# Patient Record
Sex: FEMALE | Race: WHITE | NOT HISPANIC OR LATINO | Employment: FULL TIME | URBAN - METROPOLITAN AREA
[De-identification: names, ages, dates, MRNs, and addresses within clinical notes are randomized per-mention and may not be internally consistent; named-entity substitution may affect disease eponyms.]

---

## 2018-04-02 ENCOUNTER — OFFICE VISIT (OUTPATIENT)
Dept: URGENT CARE | Facility: CLINIC | Age: 43
End: 2018-04-02

## 2018-04-02 ENCOUNTER — NON-PROVIDER VISIT (OUTPATIENT)
Dept: URGENT CARE | Facility: CLINIC | Age: 43
End: 2018-04-02

## 2018-04-02 DIAGNOSIS — Z01.89 RESPIRATORY CLEARANCE EXAMINATION, ENCOUNTER FOR: ICD-10-CM

## 2018-04-02 DIAGNOSIS — Z29.89 NEED FOR ISOLATION: ICD-10-CM

## 2018-04-02 PROCEDURE — 94375 RESPIRATORY FLOW VOLUME LOOP: CPT | Performed by: FAMILY MEDICINE

## 2018-04-02 PROCEDURE — 94010 BREATHING CAPACITY TEST: CPT | Performed by: FAMILY MEDICINE

## 2018-04-02 PROCEDURE — 8916 PR CLEARANCE ONLY: Performed by: FAMILY MEDICINE

## 2018-04-02 NOTE — PROGRESS NOTES
Saida Espinoza is a 42 y.o. female here for a non-provider visit for Mask Fit/ Respiratory Clearance    If abnormal was an in office provider notified today (if so, indicate provider)? Yes  Routed to PCP? No

## 2018-04-09 ENCOUNTER — APPOINTMENT (OUTPATIENT)
Dept: RADIOLOGY | Facility: IMAGING CENTER | Age: 43
End: 2018-04-09
Attending: NURSE PRACTITIONER
Payer: COMMERCIAL

## 2018-04-09 ENCOUNTER — OCCUPATIONAL MEDICINE (OUTPATIENT)
Dept: URGENT CARE | Facility: CLINIC | Age: 43
End: 2018-04-09
Payer: COMMERCIAL

## 2018-04-09 VITALS
WEIGHT: 88.6 LBS | OXYGEN SATURATION: 98 % | HEIGHT: 55 IN | TEMPERATURE: 98.2 F | HEART RATE: 97 BPM | DIASTOLIC BLOOD PRESSURE: 76 MMHG | RESPIRATION RATE: 16 BRPM | BODY MASS INDEX: 20.51 KG/M2 | SYSTOLIC BLOOD PRESSURE: 104 MMHG

## 2018-04-09 DIAGNOSIS — S67.10XA CRUSHING INJURY OF FINGER, INITIAL ENCOUNTER: ICD-10-CM

## 2018-04-09 PROCEDURE — 99214 OFFICE O/P EST MOD 30 MIN: CPT | Mod: 29 | Performed by: NURSE PRACTITIONER

## 2018-04-09 PROCEDURE — 73130 X-RAY EXAM OF HAND: CPT | Mod: TC,LT,29 | Performed by: NURSE PRACTITIONER

## 2018-04-09 RX ORDER — BUSPIRONE HYDROCHLORIDE 10 MG/1
10 TABLET ORAL 2 TIMES DAILY
COMMUNITY

## 2018-04-09 RX ORDER — HYDROXYZINE HYDROCHLORIDE 25 MG/1
25 TABLET, FILM COATED ORAL 3 TIMES DAILY PRN
COMMUNITY

## 2018-04-09 RX ORDER — SERTRALINE HYDROCHLORIDE 100 MG/1
100 TABLET, FILM COATED ORAL DAILY
COMMUNITY

## 2018-04-09 ASSESSMENT — ENCOUNTER SYMPTOMS
FALLS: 0
FEVER: 0
CHILLS: 0

## 2018-04-09 ASSESSMENT — PAIN SCALES - GENERAL: PAINLEVEL: 3=SLIGHT PAIN

## 2018-04-09 NOTE — LETTER
[facility]  [address], [address] [place] [address]  Phone:  [phone] - Fax:  [phone]   [facility] Progress Report and Disability Certification  Date of Service: 4/9/2018   No Show:  No  Date / Time of Next Visit: 4/16/2018   Claim Information   Patient Name: Saida Espinoza  Claim Number:     Employer:   [employer] Date of Injury: 4/9/2018     Insurer / TPA: [employer]  ID / SSN:     Occupation: Associate   Diagnosis: The encounter diagnosis was Crushing injury of finger, initial encounter.    Medical Information   Related to Industrial Injury? Yes    Subjective Complaints:  DOI 4/9/18 1st visit.  Patient was at work, lifting a tray weighing approximately 100 pounds.  As she and her partner set down the tray, there was a portion of the table that had a raised metal platform and she crushed her left middle finger between the tray and the metal.  She noted immediate pain and swelling around the PIP joint of the middle finger.  She applied ice, which helped with the swelling.  She took ibuprofen OTC for pain.  She rates pain a 3/10, worse with movement.  Denies any prior injury, pain, or limitation.  She is right hand dominant.     Objective Findings: Patient is alert, oriented, and in no acute distress.  Heart rate regular.  Respiratory rate and effort regular.  Left hand is warm, dry, and intact with no bruising, erythema, rash or lesion.  Trace focal swelling around the PIP joint of the middle finger.  Focal TTP with patient reluctant to perform ROM due to pain.  NV intact.  Sensation intact.  Cap refill < 2 seconds.  X-ray: no fracture or dislocation noted.   Pre-Existing Condition(s):     Assessment:   Initial Visit    Status: Additional Care Required  Permanent Disability:No    Plan: Medication  Comments:OTC NSAIDs and tylenol prn pain.  Ice compress and elevation prn pain.  Finger splint prn comfort measures.       Diagnostics: X-ray  Comments:No acute fracture or dislocation.    Comments:       Disability Information   Status: Released to Full Duty    From:  4/9/2018  Through: 4/16/2018 Restrictions are:     Physical Restrictions   Sitting:    Standing:    Stooping:    Bending:      Squatting:    Walking:    Climbing:    Pushing:      Pulling:    Other:    Reaching Above Shoulder (L):   Reaching Above Shoulder (R):       Reaching Below Shoulder (L):    Reaching Below Shoulder (R):      Not to exceed Weight Limits   Carrying(hrs):   Weight Limit(lb):   Lifting(hrs):   Weight  Limit(lb):     Comments:      Repetitive Actions   Hands: i.e. Fine Manipulations from Grasping:     Feet: i.e. Operating Foot Controls:     Driving / Operate Machinery:     Physician Name: MANSOOR Lares Physician Signature: LISETTE Simmons e-Signature: Dr. Richar Gomez, Medical Director   Clinic Name / Location: [address], [address] [place] [address] Clinic Phone Number: Dept: [phone]   Appointment Time: 3:15 Pm Visit Start Time: 3:27 PM   Check-In Time:  3:17 Pm Visit Discharge Time:  4:15 PM   Original-Treating Physician or Chiropractor    Page 2-Insurer/TPA    Page 3-Employer    Page 4-Employee

## 2018-04-09 NOTE — LETTER
[facility]  [address], [address] [place] [address]  Phone:  [phone] - Fax:  [phone]   [facility] Progress Report and Disability Certification  Date of Service: 4/9/2018   No Show:  No  Date / Time of Next Visit: 4/16/2018   Claim Information   Patient Name: Saida Espinoza  Claim Number:     Employer:   [employer] Date of Injury: 4/9/2018     Insurer / TPA: [employer]  ID / SSN:     Occupation: Associate   Diagnosis: The encounter diagnosis was Crushing injury of finger, initial encounter.    Medical Information   Related to Industrial Injury? Yes    Subjective Complaints:  DOI 4/9/18 1st visit.  Patient was at work, lifting a tray weighing approximately 100 pounds.  As she and her partner set down the tray, there was a portion of the table that had a raised metal platform and she crushed her left middle finger between the tray and the metal.  She noted immediate pain and swelling around the PIP joint of the middle finger.  She applied ice, which helped with the swelling.  She took ibuprofen OTC for pain.  She rates pain a 3/10, worse with movement.  Denies any prior injury, pain, or limitation.  She is right hand dominant.     Objective Findings: Patient is alert, oriented, and in no acute distress.  Heart rate regular.  Respiratory rate and effort regular.  Left hand is warm, dry, and intact with no bruising, erythema, rash or lesion.  Trace focal swelling around the PIP joint of the middle finger.  Focal TTP with patient reluctant to perform ROM due to pain.  NV intact.  Sensation intact.  Cap refill < 2 seconds.  X-ray: no fracture or dislocation noted.   Pre-Existing Condition(s):     Assessment:   Initial Visit    Status: Additional Care Required  Permanent Disability:No    Plan: Medication  Comments:OTC NSAIDs and tylenol prn pain.  Ice compress and elevation prn pain.  Finger splint prn comfort measures.       Diagnostics: X-ray  Comments:No acute fracture or dislocation.    Comments:       Disability Information   Status: Released to Full Duty    From:  4/9/2018  Through: 4/16/2018 Restrictions are:     Physical Restrictions   Sitting:    Standing:    Stooping:    Bending:      Squatting:    Walking:    Climbing:    Pushing:      Pulling:    Other:    Reaching Above Shoulder (L):   Reaching Above Shoulder (R):       Reaching Below Shoulder (L):    Reaching Below Shoulder (R):      Not to exceed Weight Limits   Carrying(hrs):   Weight Limit(lb): < or = to 10 pounds  Comments:Restrictions apply to left hand. Lifting(hrs):   Weight  Limit(lb): < or = to 10 pounds  Comments:Restrictions apply to left hand.   Comments:      Repetitive Actions   Hands: i.e. Fine Manipulations from Grasping: < or = to 1 hr/day  Comments:restrictions apply to left hand.   Feet: i.e. Operating Foot Controls:     Driving / Operate Machinery:     Physician Name: MANSOOR Lares Physician Signature: LISETTE Simmons e-Signature: Dr. Richar Gomez, Medical Director   Clinic Name / Location: [address], [address]  [facility] [place] [address] Clinic Phone Number: Dept: [phone]   Appointment Time: 3:15 Pm Visit Start Time: 3:27 PM   Check-In Time:  3:17 Pm Visit Discharge Time: 4:20 Pm    Original-Treating Physician or Chiropractor    Page 2-Insurer/TPA    Page 3-Employer    Page 4-Employee

## 2018-04-09 NOTE — LETTER
"EMPLOYEE’S CLAIM FOR COMPENSATION/ REPORT OF INITIAL TREATMENT  FORM C-4    EMPLOYEE’S CLAIM - PROVIDE ALL INFORMATION REQUESTED   First Name  Saida Last Name  Olga Birthdate                    1975                Sex  female Claim Number   Home Address  [address] Age  42 y.o. 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 (if applicable)  Lifting heavy tray    How did this injury or occupational disease occur? (Be specific an answer in detail. Use additional sheet if necessary)  A  very heavy tray was set on my hand.   If you believe that you have an occupational disease, when did you first have knowledge of the disability and it relationship to your employment?  n/a Witnesses to the Accident  n/a      Nature of Injury or Occupational Disease  Workers' Compensation  Part(s) of Body Injured or Affected  Finger (L), ,     I certify that the above is true and correct to the best of my knowledge and that I have provided this information in order to obtain the benefits of [place]’s " [place]  I hereby authorize any physician, chiropractor, surgeon, practitioner, or other person, any hospital, including [facility] or [facility], any medical service organization, any insurance company, or other institution or organization to release to each other, any medical or other information, including benefits paid or payable, pertinent to this injury or disease, except information relative to diagnosis, treatment and/or counseling for AIDS, psychological conditions, alcohol or controlled substances, for which I must give specific authorization.  A Photostat of this authorization shall be as valid as the original.     Date 4/9/18   Place [facility]   Employee’s Signature   THIS REPORT MUST BE COMPLETED AND MAILED WITHIN 3 WORKING DAYS OF TREATMENT   Place  [facility]  Name of Facility  [facility]   Date  4/9/2018 Diagnosis  (S67.10XA) Crushing injury of finger, initial encounter Is there evidence the injured employee was under the influence of alcohol and/or another controlled substance at the time of accident?   Hour  3:27 PM Description of Injury or Disease  The encounter diagnosis was Crushing injury of finger, initial encounter. No   Treatment  OTC NSAIDs and tylenol prn pain.  Ice compress and elevation prn pain.  Finger splint applied prn comfort measures.  Have you advised the patient to remain off work five days or more? No   X-Ray Findings  Negative  Comments:No acute fracture or dislocation.   If Yes   From Date  To Date      From information given by the employee, together with medical evidence, can you directly connect this injury or occupational disease as job incurred?  Yes If No Full Duty  No Modified Duty  Yes   Is additional medical care by a physician indicated?  Yes  Comments:Follow up in 1 week. If Modified Duty, Specify any Limitations /  "Restrictions  Limit fine hand manipulatives with left hand to 1 hour.  Limit lifting and carrying to 10 pounds with the left hand.   Do you know of any previous injury or disease contributing to this condition or occupational disease?                            No   Date  4/10/2018 Print Doctor’s Name MANSOOR Lares certify the employer’s copy of  this form was mailed on:   Address  [address] Insurer’s Use Only     [place] Zip  [address]    Provider’s Tax ID Number  [id number] Telephone  Dept: [phone]        e-LISETTE Elliott   e-Signature: Dr. Richar Gomez, Medical Director Degree  APRN        ORIGINAL-TREATING PHYSICIAN OR CHIROPRACTOR    PAGE 2-INSURER/TPA    PAGE 3-EMPLOYER    PAGE 4-EMPLOYEE             Form C-4 (rev10/07)              BRIEF DESCRIPTION OF RIGHTS AND BENEFITS  (Pursuant to [place]RS 616C.050)    Notice of Injury or Occupational Disease (Incident Report Form C-1): If an injury or occupational disease (OD) arises out of and in the  course of employment, you must provide written notice to your employer as soon as practicable, but no later than 7 days after the accident or  OD. Your employer shall maintain a sufficient supply of the required forms.    Claim for Compensation (Form C-4): If medical treatment is sought, the form C-4 is available at the place of initial treatment. A completed  \"Claim for Compensation\" (Form C-4) must be filed within 90 days after an accident or OD. The treating physician or chiropractor must,  within 3 working days after treatment, complete and mail to the employer, the employer's insurer and third-party , the Claim for  Compensation.    Medical Treatment: If you require medical treatment for your on-the-job injury or OD, you may be required to select a physician or  chiropractor from a list provided by your workers’ compensation insurer, if it has contracted with an Organization for Managed Care " (MCO) or  Preferred Provider Organization (PPO) or providers of health care. If your employer has not entered into a contract with an MCO or PPO, you  may select a physician or chiropractor from the Panel of Physicians and Chiropractors. Any medical costs related to your industrial injury or  OD will be paid by your insurer.    Temporary Total Disability (TTD): If your doctor has certified that you are unable to work for a period of at least 5 consecutive days, or 5  cumulative days in a 20-day period, or places restrictions on you that your employer does not accommodate, you may be entitled to TTD  compensation.    Temporary Partial Disability (TPD): If the wage you receive upon reemployment is less than the compensation for TTD to which you are  entitled, the insurer may be required to pay you TPD compensation to make up the difference. TPD can only be paid for a maximum of 24  months.    Permanent Partial Disability (PPD): When your medical condition is stable and there is an indication of a PPD as a result of your injury or  OD, within 30 days, your insurer must arrange for an evaluation by a rating physician or chiropractor to determine the degree of your PPD. The  amount of your PPD award depends on the date of injury, the results of the PPD evaluation and your age and wage.    Permanent Total Disability (PTD): If you are medically certified by a treating physician or chiropractor as permanently and totally disabled  and have been granted a PTD status by your insurer, you are entitled to receive monthly benefits not to exceed 66 2/3% of your average  monthly wage. The amount of your PTD payments is subject to reduction if you previously received a PPD award.    Vocational Rehabilitation Services: You may be eligible for vocational rehabilitation services if you are unable to return to the job due to a  permanent physical impairment or permanent restrictions as a result of your injury or occupational  disease.    Transportation and Per Myriam Reimbursement: You may be eligible for travel expenses and per myriam associated with medical treatment.    Reopening: You may be able to reopen your claim if your condition worsens after claim closure.    Appeal Process: If you disagree with a written determination issued by the insurer or the insurer does not respond to your request, you may  appeal to the Department of Administration, , by following the instructions contained in your determination letter. You must  appeal the determination within 70 days from the date of the determination letter at [address], [address], [place], [place]  [address], or [address], [place], [place] [address]. If you disagree with the  decision, you may appeal to the  Department of Administration, . You must file your appeal within 30 days from the date of the  decision  letter at [address], [address], [place], [place] [address], or [address], [place], [place] [address]. If you  disagree with a decision of an , you may file a petition for judicial review with the District Court. You must do so within 30  days of the Appeal Officer’s decision. You may be represented by an  at your own expense or you may contact the [facility] for possible  representation.    [place]  for Injured Workers (NAIW): If you disagree with a  decision, you may request that NAIW represent you  without charge at an  Hearing. For information regarding denial of benefits, you may contact the [facility] at: [address], [address], [place] [address], [phone], or [address] [facility] [address], [place] [address], [phone]    To File a Complaint with the Division: If you wish to file a complaint with the  of the Division of Industrial Relations (DIR),  please contact  the Workers’ Compensation Section, [address], [address], [place], [place] [address], telephone ([phone]) [phone], or  [address], [address], [place], [place] [address], telephone [phone].    For assistance with Workers’ Compensation Issues: you may contact the Office of the [facility] Consumer Health Assistance, [address] [place] [address], [address], [place], [place] [address], Toll Free 1-697.573.1060, Web site: http://govcha.[facility].nv., E-mail  Halina@[facility].nv.                                                                                                                                                                                                                                   __________________________________________________________________                                                                   ______4/9/18_________                Employee Name / Signature                                                                                                                                                       Date                                                                                                                                                                                                     D-2 (rev. 10/07)

## 2018-04-09 NOTE — LETTER
"EMPLOYEE’S CLAIM FOR COMPENSATION/ REPORT OF INITIAL TREATMENT  FORM C-4    EMPLOYEE’S CLAIM - PROVIDE ALL INFORMATION REQUESTED   First Name  Saida Last Name  Olga Birthdate                    1975                Sex  female Claim Number   Home Address  [address] Age  42 y.o. 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 (if applicable)  Lifting heavy tray    How did this injury or occupational disease occur? (Be specific an answer in detail. Use additional sheet if necessary)  A  very heavy tray was set on my hand.   If you believe that you have an occupational disease, when did you first have knowledge of the disability and it relationship to your employment?  n/a Witnesses to the Accident  n/a      Nature of Injury or Occupational Disease  Workers' Compensation  Part(s) of Body Injured or Affected  Finger (L), ,     I certify that the above is true and correct to the best of my knowledge and that I have provided this information in order to obtain the benefits of [place]’s " [place] inclusive or Chapter 617 of [place]).  I hereby authorize any physician, chiropractor, surgeon, practitioner, or other person, any hospital, including [facility] or [facility], any medical service organization, any insurance company, or other institution or organization to release to each other, any medical or other information, including benefits paid or payable, pertinent to this injury or disease, except information relative to diagnosis, treatment and/or counseling for AIDS, psychological conditions, alcohol or controlled substances, for which I must give specific authorization.  A Photostat of this authorization shall be as valid as the original.     Date 4/9/18   Place [facility]   Employee’s Signature   THIS REPORT MUST BE COMPLETED AND MAILED WITHIN 3 WORKING DAYS OF TREATMENT   Place  [facility]  Name of Facility  [facility]   Date  4/9/2018 Diagnosis  (S67.10XA) Crushing injury of finger, initial encounter Is there evidence the injured employee was under the influence of alcohol and/or another controlled substance at the time of accident?   Hour  3:27 PM Description of Injury or Disease  The encounter diagnosis was Crushing injury of finger, initial encounter. No   Treatment  OTC NSAIDs and tylenol prn pain.  Ice compress and elevation prn pain.  Finger splint applied prn comfort measures.  Have you advised the patient to remain off work five days or more? No   X-Ray Findings  Negative  Comments:No acute fracture or dislocation.   If Yes   From Date  To Date      From information given by the employee, together with medical evidence, can you directly connect this injury or occupational disease as job incurred?  Yes If No Full Duty  Yes Modified Duty      Is additional medical care by a physician indicated?  Yes  Comments:Follow up in 1 week. If Modified Duty, Specify any Limitations /  "Restrictions      Do you know of any previous injury or disease contributing to this condition or occupational disease?                            No   Date  4/9/2018 Print Doctor’s Name MANSOOR Lares I certify the employer’s copy of  this form was mailed on:   Address  [address], [address] Insurer’s Use Only     [place] Zip  [address]    Provider’s Tax ID Number  [id number] Telephone  Dept: [phone]        e-LISETTE Elliott   e-Signature: Dr. Richar Gomez, Medical Director Degree  APRN        ORIGINAL-TREATING PHYSICIAN OR CHIROPRACTOR    PAGE 2-INSURER/TPA    PAGE 3-EMPLOYER    PAGE 4-EMPLOYEE             Form C-4 (rev10/07)              BRIEF DESCRIPTION OF RIGHTS AND BENEFITS  (Pursuant to NRS 616C.050)    Notice of Injury or Occupational Disease (Incident Report Form C-1): If an injury or occupational disease (OD) arises out of and in the  course of employment, you must provide written notice to your employer as soon as practicable, but no later than 7 days after the accident or  OD. Your employer shall maintain a sufficient supply of the required forms.    Claim for Compensation (Form C-4): If medical treatment is sought, the form C-4 is available at the place of initial treatment. A completed  \"Claim for Compensation\" (Form C-4) must be filed within 90 days after an accident or OD. The treating physician or chiropractor must,  within 3 working days after treatment, complete and mail to the employer, the employer's insurer and third-party , the Claim for  Compensation.    Medical Treatment: If you require medical treatment for your on-the-job injury or OD, you may be required to select a physician or  chiropractor from a list provided by your workers’ compensation insurer, if it has contracted with an Organization for Managed Care (MCO) or  Preferred Provider Organization (PPO) or providers of health care. If your employer has not entered into a " contract with an MCO or PPO, you  may select a physician or chiropractor from the Panel of Physicians and Chiropractors. Any medical costs related to your industrial injury or  OD will be paid by your insurer.    Temporary Total Disability (TTD): If your doctor has certified that you are unable to work for a period of at least 5 consecutive days, or 5  cumulative days in a 20-day period, or places restrictions on you that your employer does not accommodate, you may be entitled to TTD  compensation.    Temporary Partial Disability (TPD): If the wage you receive upon reemployment is less than the compensation for TTD to which you are  entitled, the insurer may be required to pay you TPD compensation to make up the difference. TPD can only be paid for a maximum of 24  months.    Permanent Partial Disability (PPD): When your medical condition is stable and there is an indication of a PPD as a result of your injury or  OD, within 30 days, your insurer must arrange for an evaluation by a rating physician or chiropractor to determine the degree of your PPD. The  amount of your PPD award depends on the date of injury, the results of the PPD evaluation and your age and wage.    Permanent Total Disability (PTD): If you are medically certified by a treating physician or chiropractor as permanently and totally disabled  and have been granted a PTD status by your insurer, you are entitled to receive monthly benefits not to exceed 66 2/3% of your average  monthly wage. The amount of your PTD payments is subject to reduction if you previously received a PPD award.    Vocational Rehabilitation Services: You may be eligible for vocational rehabilitation services if you are unable to return to the job due to a  permanent physical impairment or permanent restrictions as a result of your injury or occupational disease.    Transportation and Per Myriam Reimbursement: You may be eligible for travel expenses and per myriam associated with  medical treatment.    Reopening: You may be able to reopen your claim if your condition worsens after claim closure.    Appeal Process: If you disagree with a written determination issued by the insurer or the insurer does not respond to your request, you may  appeal to the Department of Administration, , by following the instructions contained in your determination letter. You must  appeal the determination within 70 days from the date of the determination letter at [address], [address], [place], [place]  [address], or [address], [place], [place] [address]. If you disagree with the  decision, you may appeal to the  Department of Administration, . You must file your appeal within 30 days from the date of the  decision  letter at [address], [address], [place], [place] [address], or [address], [place], [place] [address]. If you  disagree with a decision of an , you may file a petition for judicial review with the District Court. You must do so within 30  days of the Appeal Officer’s decision. You may be represented by an  at your own expense or you may contact the [facility] for possible  representation.    [place]  for Injured Workers (NAIW): If you disagree with a  decision, you may request that NAIW represent you  without charge at an  Hearing. For information regarding denial of benefits, you may contact the [facility] at: [address], [address], [place] [address], [phone], or [address], [address], [place] [address], [phone]    To File a Complaint with the Division: If you wish to file a complaint with the  of the Division of Industrial Relations (DIR),  please contact the Workers’ Compensation Section, [address], [address], [place], [place] [address], telephone [phone]) [phone],  or  [address], [address], [place], [place] [address], telephone [phone]) [phone].    For assistance with Workers’ Compensation Issues: you may contact the Office of the Governor Consumer Health Assistance, [address] [place] [address], [address], [place], [place] [address], Toll Free 1-190.571.1528, Web site: http://[employer].[facility].nv., E-mail  Halina@[email or web address].[email or web address].                                                                                                                                                                                                                                   __________________________________________________________________                                                                   ______4/9/18___________                Employee Name / Signature                                                                                                                                                       Date                                                                                                                                                                                                     D-2 (rev. 10/07)

## 2018-04-09 NOTE — PROGRESS NOTES
"Subjective:      Saida Espinoza is a 42 y.o. female who presents with Finger Pain (left middle finger crush injury today; approx 100lbs on finger )      DOI 4/9/18 1st visit.  Patient was at work, lifting a tray weighing approximately 100 pounds.  As she and her partner set down the tray, there was a portion of the table that had a raised metal platform and she crushed her left middle finger between the tray and the metal.  She noted immediate pain and swelling around the PIP joint of the middle finger.  She applied ice, which helped with the swelling.  She took ibuprofen OTC for pain.  She rates pain a 3/10, worse with movement.  Denies any prior injury, pain, or limitation.  She is right hand dominant.       HPI    Review of Systems   Constitutional: Negative for chills, fever and malaise/fatigue.   Musculoskeletal: Positive for joint pain. Negative for falls.     Medications, Allergies, and current problem list reviewed today in Epic     Objective:     /76   Pulse 97   Temp 36.8 °C (98.2 °F)   Resp 16   Ht 1.397 m (4' 7\")   Wt 40.2 kg (88 lb 9.6 oz)   SpO2 98%   BMI 20.59 kg/m²      Physical Exam    Patient is alert, oriented, and in no acute distress.  Heart rate regular.  Respiratory rate and effort regular.  Left hand is warm, dry, and intact with no bruising, erythema, rash or lesion.  Trace focal swelling around the PIP joint of the middle finger.  Focal TTP with patient reluctant to perform ROM due to pain.  NV intact.  Sensation intact.  Cap refill < 2 seconds.  X-ray: no fracture or dislocation noted.     View [employer]     DX-HAND 3+ (Order #[id number]) on 4/9/18   Narrative       4/9/2018 3:44 PM    HISTORY/REASON FOR EXAM:  Pain/Deformity Following Trauma  Crush injury    TECHNIQUE/EXAM DESCRIPTION AND NUMBER OF VIEWS:  3 views of the LEFT hand.    COMPARISON:  None    FINDINGS:  There is no evidence of fracture or dislocation. Alignment is maintained. No osseous erosion or " "periosteal new bone formation is seen. There is mild periarticular osteopenia.     Impression       No evidence of acute fracture or dislocation.   Reading Provider Reading Date   Lizeth Cabral M.D. Apr 9, 2018   Signing Provider Signing Date Signing Time   Lizeth Cabral M.D. Apr 9, 2018  4:08 PM       Assessment/Plan:     1. Crushing injury of finger, initial encounter  - DX-HAND 3+ LEFT; Future    OTC NSAIDs or tylenol prn pain.  Ice compress and elevation prn pain.  Finger splint prn comfort measures.  No work restrictions.  Follow up in 1 week.  Discussed incidental finding of mild periarticular osteopenia with patient.  Recommend she follow up with PCP for further evaluation and care.  Patient is 4'7\" with a weight of 88 lbs and has a definite risk for osteoporosis.    Patient verbalized understanding of and agreed with plan of care.  4/10/18 3:10 PM  PC from patient.  At time of visit yesterday, patient felt that she would be able to perform full duty without difficulty.  However, today full duty exacerbated pain to 8/10 and she went home.  She would like to consider work restrictions that were discussed as possible plan of care at visit yesterday.  Will addend D-39 and C-4 to reflect plan of care with the following restrictions: Limit fine hand manipulatives to 1 hour per day with left hand.  Limit lift and carry to 10 pounds with the left hand.  Continue OTC NSAIDs/tylenol and finger splint as previously discussed.  Follow up as scheduled.  Patient verbalized understanding of and agreed with plan of care.  "

## 2018-04-16 ENCOUNTER — OCCUPATIONAL MEDICINE (OUTPATIENT)
Dept: URGENT CARE | Facility: CLINIC | Age: 43
End: 2018-04-16
Payer: COMMERCIAL

## 2018-04-16 VITALS
DIASTOLIC BLOOD PRESSURE: 74 MMHG | HEIGHT: 55 IN | OXYGEN SATURATION: 98 % | HEART RATE: 86 BPM | BODY MASS INDEX: 20.37 KG/M2 | RESPIRATION RATE: 16 BRPM | SYSTOLIC BLOOD PRESSURE: 96 MMHG | TEMPERATURE: 97.9 F | WEIGHT: 88 LBS

## 2018-04-16 DIAGNOSIS — S67.10XD CRUSHING INJURY OF FINGER, SUBSEQUENT ENCOUNTER: ICD-10-CM

## 2018-04-16 PROCEDURE — 99213 OFFICE O/P EST LOW 20 MIN: CPT | Mod: 29 | Performed by: NURSE PRACTITIONER

## 2018-04-16 ASSESSMENT — ENCOUNTER SYMPTOMS
DIAPHORESIS: 0
SENSORY CHANGE: 0
WEAKNESS: 0
FEVER: 0
TINGLING: 0
FOCAL WEAKNESS: 0
CHILLS: 0

## 2018-04-16 ASSESSMENT — PAIN SCALES - GENERAL: PAINLEVEL: 1=MINIMAL PAIN

## 2018-04-16 NOTE — LETTER
[facility]  [address] [address]  Phone:  [phone] - Fax:  [phone]   [facility] Progress Report and Disability Certification  Date of Service: 4/16/2018   No Show:  No  Date / Time of Next Visit: 4/23/2018   Claim Information   Patient Name: Saida Espinoza  Claim Number:     Employer:   [employer] Date of Injury: 4/9/2018     Insurer / TPA: [employer]  ID / SSN:     Occupation: Associate   Diagnosis: The encounter diagnosis was Crushing injury of finger, subsequent encounter.    Medical Information   Related to Industrial Injury? Yes    Subjective Complaints:  DOI 4/9/18 2nd visit.  Patient was at work, lifting a tray weighing approximately 100 pounds.  As she and her partner set down the tray, there was a portion of the table that had a raised metal platform and she crushed her left middle finger between the tray and the metal.  Since last visit she reports reduced but not resolved pain.  Minimal pain at rest, but movement provokes pain to a 3/10.  No numbness, tingling, or weakness. Denies any prior injury, pain, or limitation.  She is right hand dominant.  She is tolerating work restrictions and feels capable of expanded but not full duty.    Objective Findings: Patient is alert and oriented.  Left hand is warm, dry, and intact with no bruising, swelling, erythema, rash or lesion.  Middle finger demonstrates pain on TTP and with flexion.  NV intact.  Sensation intact.  Cap refill < 2 seconds.     Pre-Existing Condition(s):     Assessment:   Condition Improved    Status: Additional Care Required  Comments:Follow up in 1 week.  Permanent Disability:No    Plan: Medication  Comments:OTC Aleve prn pain.    Diagnostics:      Comments:       Disability Information   Status: Released to Restricted Duty    From:  4/16/2018  Through: 4/23/2018 Restrictions are: Temporary   Physical Restrictions   Sitting:   Standing:    Stooping:    Bending:      Squatting:    Walking:    Climbing:    Pushing:      Pulling:    Other:    Reaching Above Shoulder (L):   Reaching Above Shoulder (R):       Reaching Below Shoulder (L):    Reaching Below Shoulder (R):      Not to exceed Weight Limits   Carrying(hrs):   Weight Limit(lb): < or = to 25 pounds Lifting(hrs):   Weight  Limit(lb): < or = to 25 pounds   Comments:      Repetitive Actions   Hands: i.e. Fine Manipulations from Grasping:     Feet: i.e. Operating Foot Controls:     Driving / Operate Machinery:     Physician Name: MANSOOR Lares Physician Signature: LISETTE Simmons e-Signature: Dr. Richar Gomez, Medical Director   Clinic Name / Location: [address], [address]  [place] [address] Clinic Phone Number: Dept: [phone]   Appointment Time: 8:35 Am Visit Start Time: 8:06 AM   Check-In Time:  8:02 Am Visit Discharge Time:  8:17 PM   Original-Treating Physician or Chiropractor    Page 2-Insurer/TPA    Page 3-Employer    Page 4-Employee

## 2018-04-16 NOTE — PROGRESS NOTES
"Subjective:      Sadia Espinoza is a 42 y.o. female who presents with Finger Pain (left middle finger pain better since last visit but still mildly sore )      DOI 4/9/18 2nd visit.  Patient was at work, lifting a tray weighing approximately 100 pounds.  As she and her partner set down the tray, there was a portion of the table that had a raised metal platform and she crushed her left middle finger between the tray and the metal.  Since last visit she reports reduced but not resolved pain.  Minimal pain at rest, but movement provokes pain to a 3/10.  No numbness, tingling, or weakness. Denies any prior injury, pain, or limitation.  She is right hand dominant.  She is tolerating work restrictions and feels capable of expanded but not full duty.      HPI    Review of Systems   Constitutional: Negative for chills, diaphoresis, fever and malaise/fatigue.   Neurological: Negative for tingling, sensory change, focal weakness and weakness.     Medications, Allergies, and current problem list reviewed today in Epic     Objective:     BP (!) 96/74   Pulse 86   Temp 36.6 °C (97.9 °F)   Resp 16   Ht 1.397 m (4' 7\")   Wt 39.9 kg (88 lb)   SpO2 98%   BMI 20.45 kg/m²      Physical Exam    Patient is alert and oriented.  Left hand is warm, dry, and intact with no bruising, swelling, erythema, rash or lesion.  Middle finger demonstrates pain on TTP and with flexion.  NV intact.  Sensation intact.  Cap refill < 2 seconds.         Assessment/Plan:     1. Crushing injury of finger, subsequent encounter    Discussed exam findings with patient: improving but not resolved.  OTC Aleve prn pain.  Work restrictions per D-39.  Follow up in 1 week, sooner if worse.  Patient verbalized understanding of and agreed with plan of care.        "

## 2018-04-23 ENCOUNTER — OCCUPATIONAL MEDICINE (OUTPATIENT)
Dept: URGENT CARE | Facility: CLINIC | Age: 43
End: 2018-04-23
Payer: COMMERCIAL

## 2018-04-23 VITALS
WEIGHT: 88 LBS | HEIGHT: 55 IN | TEMPERATURE: 98.6 F | SYSTOLIC BLOOD PRESSURE: 98 MMHG | DIASTOLIC BLOOD PRESSURE: 66 MMHG | RESPIRATION RATE: 16 BRPM | BODY MASS INDEX: 20.37 KG/M2 | OXYGEN SATURATION: 96 % | HEART RATE: 98 BPM

## 2018-04-23 VITALS
HEIGHT: 55 IN | WEIGHT: 88 LBS | HEART RATE: 98 BPM | OXYGEN SATURATION: 96 % | SYSTOLIC BLOOD PRESSURE: 98 MMHG | RESPIRATION RATE: 16 BRPM | DIASTOLIC BLOOD PRESSURE: 66 MMHG | BODY MASS INDEX: 20.37 KG/M2 | TEMPERATURE: 97.8 F

## 2018-04-23 DIAGNOSIS — S67.10XA CRUSHING INJURY OF FINGER OF LEFT HAND: ICD-10-CM

## 2018-04-23 DIAGNOSIS — S67.10XD CRUSHING INJURY OF FINGER, SUBSEQUENT ENCOUNTER: ICD-10-CM

## 2018-04-23 PROCEDURE — 99212 OFFICE O/P EST SF 10 MIN: CPT | Mod: 29 | Performed by: NURSE PRACTITIONER

## 2018-04-23 ASSESSMENT — PAIN SCALES - GENERAL
PAINLEVEL: 1=MINIMAL PAIN
PAINLEVEL: 1=MINIMAL PAIN

## 2018-04-23 ASSESSMENT — ENCOUNTER SYMPTOMS
CONSTITUTIONAL NEGATIVE: 1
SENSORY CHANGE: 0
TINGLING: 0
RESPIRATORY NEGATIVE: 1

## 2018-04-23 NOTE — LETTER
[facility]  [address] [address]  Phone:  [phone] - Fax:  [phone]   Occ[facility]ational [facility] Progress Report and Disability Certification  Date of Service: 4/23/2018   No Show:  No  Date / Time of Next Visit: 4/30/2018   Claim Information   Patient Name: Saida Espinoza  Claim Number:     Employer:   [employer] Date of Injury: 4/9/2018     Insurer / TPA:   [employer]ly  ID / SSN: [id number]    Occupation: Associate   Diagnosis: The encounter diagnosis was Crushing injury of finger, subsequent encounter.    Medical Information   Related to Industrial Injury? Yes    Subjective Complaints:  DOI 4/9/18 3rd visit.  Patient was at work, lifting a tray weighing approximately 100 pounds.  As she and her partner set down the tray, there was a portion of the table that had a raised metal platform and she crushed her left middle finger between the tray and the metal.  Since last visit she reports unchanged.  Minimal pain at rest, but movement provokes pain to a 5/10.  No numbness, tingling, or weakness. Denies any prior injury, pain, or limitation.  She is right hand dominant.  She is not tolerating work restrictions. She states she is unable to lift 25 pounds with her current pain.    Objective Findings: Left middle finger with tenderness with palpation at PIP>DIP. Minimal erythema. Not warm to touch, no obvious cellulitic appearance. Strength 5/5 to that digit on flexion and extension.    Pre-Existing Condition(s): Denies   Assessment:   Condition Same    Status: Discharged / Care Transfer  Permanent Disability:No    Plan:   Comments:OTC pain relievers    Diagnostics: X-ray  Comments:Initial xray negative for fracture     Comments:       Disability Information   Status:      From:  4/23/2018  Through: 4/30/2018 Restrictions are:     Physical Restrictions   Sitting:    Standing:    Stooping:    Bending:      Squatting:    Walking:    Climbing:    Pushing:     Pulling:    Other:    Reaching Above Shoulder (L):   Reaching Above Shoulder (R):       Reaching Below Shoulder (L):    Reaching Below Shoulder (R):      Not to exceed Weight Limits   Carrying(hrs):   Weight Limit(lb): < or = to 10 pounds  Comments:Left hand  Lifting(hrs):   Weight  Limit(lb): < or = to 10 pounds  Comments:left hand    Comments: 3rd visit - unchanged pain and exam. Unable to tolerate full duty. Transfer to occupation medicine.     Repetitive Actions   Hands: i.e. Fine Manipulations from Grasping:     Feet: i.e. Operating Foot Controls:     Driving / Operate Machinery:     Physician Name: NATALIIA Leon Physician Signature: TOMMIE Mcintyre e-Signature: Dr. Richar Gomez, Medical Director   Clinic Name / Location: [address], [address]  [place] [address] Clinic Phone Number: Dept: [phone]   Appointment Time: 8:30 Am Visit Start Time: 8:25 AM   Check-In Time:  8:24 Am Visit Discharge Time: 0833   Original-Treating Physician or Chiropractor    Page 2-Insurer/TPA    Page 3-Employer    Page 4-Employee

## 2018-04-23 NOTE — PROGRESS NOTES
"Subjective:      Saida Espinoza is a 42 y.o. female who presents with Finger Pain (left middle finger pain same since last visit)  Surgical HX reviewed in epic and not pertinent to today's visit  No past medical history on file.  Current medications reviewed.  Social History   Substance Use Topics   • Smoking status: Current Every Day Smoker     Packs/day: 0.50     Types: Cigarettes   • Smokeless tobacco: Never Used   • Alcohol use Not on file         DOI 4/9/18 3rd visit.  Patient was at work, lifting a tray weighing approximately 100 pounds.  As she and her partner set down the tray, there was a portion of the table that had a raised metal platform and she crushed her left middle finger between the tray and the metal.  Since last visit she reports unchanged.  Minimal pain at rest, but movement provokes pain to a 5/10.  No numbness, tingling, or weakness. Denies any prior injury, pain, or limitation.  She is right hand dominant.  She is not tolerating work restrictions. She states she is unable to lift 25 pounds with her current pain.      HPI  Chief Complaint   Patient presents with   • Finger Pain     left middle finger pain same since last visit     As above   Review of Systems   Constitutional: Negative.    Respiratory: Negative.    Musculoskeletal:        Left middle finger pain   Skin: Negative.    Neurological: Negative for tingling and sensory change.   All other systems reviewed and are negative.         Objective:     BP (!) 98/66   Pulse 98   Temp 37 °C (98.6 °F)   Resp 16   Ht 1.397 m (4' 7\")   Wt 39.9 kg (88 lb)   SpO2 96%   BMI 20.45 kg/m²      Physical Exam   Constitutional: She is oriented to person, place, and time. She appears well-developed. No distress.   Eyes: EOM are normal.   Pulmonary/Chest: Effort normal. No respiratory distress.   Musculoskeletal:   See below for focused exam    Neurological: She is alert and oriented to person, place, and time.   Skin: Skin is warm and dry. " Capillary refill takes less than 2 seconds.   Psychiatric: She has a normal mood and affect.       Left middle finger with tenderness with palpation at PIP>DIP. Minimal erythema. Not warm to touch, no obvious cellulitic appearance. Strength 5/5 to that digit on flexion and extension.        Assessment/Plan:     1. Crushing injury of finger, subsequent encounter  REFERRAL TO OCCUPATIONAL MEDICINE     Continue OTC pain relievers  Transfer to [facility] as she reports that she not better and was unable to tolerate advance work restrictions  10# weight limit    Please make an appointment for follow up with your primary care provider. Return for any change in condition, worsening of symptoms, or any other concerns. Please go to the nearest emergency room for any shortness of breath or chest pain or for any of the emergent conditions we have discussed. Patient states understanding to plan of care and discharge instructions.    Please note that this dictation was created using voice recognition software. I have worked with consultants from the vendor as well as technical experts from [facility] to optimize the interface. I have made every reasonable attempt to correct obvious errors, but I expect that there are errors of grammar and possibly content that I did not discover before finalizing the note.

## 2018-04-25 ENCOUNTER — OCCUPATIONAL MEDICINE (OUTPATIENT)
Dept: OCCUPATIONAL MEDICINE | Facility: CLINIC | Age: 43
End: 2018-04-25
Payer: COMMERCIAL

## 2018-04-25 VITALS
WEIGHT: 88 LBS | TEMPERATURE: 98.1 F | SYSTOLIC BLOOD PRESSURE: 100 MMHG | RESPIRATION RATE: 16 BRPM | OXYGEN SATURATION: 99 % | BODY MASS INDEX: 20.37 KG/M2 | HEIGHT: 55 IN | HEART RATE: 80 BPM | DIASTOLIC BLOOD PRESSURE: 72 MMHG

## 2018-04-25 DIAGNOSIS — S67.193D CRUSHING INJURY OF LEFT MIDDLE FINGER, SUBSEQUENT ENCOUNTER: ICD-10-CM

## 2018-04-25 PROCEDURE — 99203 OFFICE O/P NEW LOW 30 MIN: CPT | Performed by: PREVENTIVE MEDICINE

## 2018-04-25 NOTE — LETTER
[address],   [address] [place] [address]  Phone:  [phone] - Fax:  [phone]   [facility] Progress Report and Disability Certification  Date of Service: 4/25/2018   No Show:  No  Date / Time of Next Visit: 5/4/18 @ 8:15am   Claim Information   Patient Name: Saida Espinoza  Claim Number:     Employer:   [employer] Date of Injury: 4/9/2018     Insurer / TPA:   Angel ID / SSN:     Occupation: Associate   Diagnosis: The encounter diagnosis was Crushing injury of left middle finger, subsequent encounter.    Medical Information   Related to Industrial Injury? Yes    Subjective Complaints:  DOI 4/9/2018: 41 yo female presents with crush injury to the left middle finger. She and a partner set down a tray weighing about 100lbs. There was a portion of the table that had a raised metal platform and she crushed her left middle finger between the tray and the metal. XR left hand were negative. Patient notes continued pain from the PIP joint to the distal tip of the middle finger. She notes painful range of motion of the DIP joint. She notes she gets occasional swelling of the digits as well. She's been working light duty. She uses aluminum finger splint for the first 2 days. Denies prior fractures. Takes Aleve for relief. She states at rest her pain is minimal, worsened with movements.   Objective Findings: Left Hand: Slight minimal swelling distal tip of finger/DIP joint. Slight decrease in the DIP flexion. Tenderness to palpation from PIP joint to distal tip of finger.   Pre-Existing Condition(s):     Assessment:   Condition Same    Status: Additional Care Required  Permanent Disability:No    Plan:      Diagnostics:      Comments:  Use aluminum finger splint at work and at night  Gentle stretches of finger at home  Aleve as needed  Restricted duty  Follow up 2 weeks    Disability Information   Status: Released to Restricted Duty       From:  4/25/2018  Through: 5/8/2018 Restrictions are: Temporary   Physical Restrictions   Sitting:    Standing:    Stooping:    Bending:      Squatting:    Walking:    Climbing:    Pushing:      Pulling:    Other:    Reaching Above Shoulder (L):   Reaching Above Shoulder (R):       Reaching Below Shoulder (L):    Reaching Below Shoulder (R):      Not to exceed Weight Limits   Carrying(hrs):   Weight Limit(lb):   Lifting(hrs):   Weight  Limit(lb):     Comments: Limit left hand to less than 10 lbs lift/push/pull    Repetitive Actions   Hands: i.e. Fine Manipulations from Grasping:     Feet: i.e. Operating Foot Controls:     Driving / Operate Machinery:     Physician Name: Mike Aelxis D.O. Physician Signature: MIKE Pineda D.O. e-Signature: Dr. Richar Gomez, Medical Director   Clinic Name / Location: [address],   [address] [place] [id number] Clinic Phone Number: Dept: [phone]   Appointment Time: 9:55 Am Visit Start Time: 9:44 AM   Check-In Time:  9:42 Am Visit Discharge Time:  10:17am   Original-Treating Physician or Chiropractor    Page 2-Insurer/TPA    Page 3-Employer    Page 4-Employee

## 2018-04-25 NOTE — PROGRESS NOTES
"Subjective:      Saida Espinoza is a 42 y.o. female who presents with Follow-Up ([facility] DOI 04/09/2018 L Middle Finger Same #3)      DOI 4/9/2018: 43 yo female presents with crush injury to the left middle finger. She and a partner set down a tray weighing about 100lbs. There was a portion of the table that had a raised metal platform and she crushed her left middle finger between the tray and the metal. XR left hand were negative. Patient notes continued pain from the PIP joint to the distal tip of the middle finger. She notes painful range of motion of the DIP joint. She notes she gets occasional swelling of the digits as well. She's been working light duty. She uses aluminum finger splint for the first 2 days. Denies prior fractures. Takes Aleve for relief. She states at rest her pain is minimal, worsened with movements.     HPI    ROS  ROS: All systems were reviewed on intake form, form was reviewed and signed. See scanned documents in media. Pertinent positives and negatives included in HPI.    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 No   BMI 20.45 kg/m²      Physical Exam   Constitutional: She is oriented to person, place, and time. She appears well-developed and well-nourished.   HENT:   Right Ear: External ear normal.   Left Ear: External ear normal.   Eyes: Conjunctivae and EOM are normal.   Cardiovascular: Normal rate.    Pulmonary/Chest: Effort normal.   Neurological: She is alert and oriented to person, place, and time.   Skin: Skin is warm and dry.   Psychiatric: She has a normal mood and affect. Judgment normal.       Left Hand: Slight minimal swelling distal tip of finger/DIP " joint. Slight decrease in the DIP flexion. Tenderness to palpation from PIP joint to distal tip of finger.       Assessment/Plan:     1. Crushing injury of left middle finger, subsequent encounter  Use aluminum finger splint at work and at night  Gentle stretches of finger at home  Aleve as needed  Restricted duty  Follow up 2 weeks